# Patient Record
Sex: FEMALE | Race: WHITE
[De-identification: names, ages, dates, MRNs, and addresses within clinical notes are randomized per-mention and may not be internally consistent; named-entity substitution may affect disease eponyms.]

---

## 2020-04-04 ENCOUNTER — HOSPITAL ENCOUNTER (EMERGENCY)
Dept: HOSPITAL 75 - ER FS | Age: 45
Discharge: HOME | End: 2020-04-04
Payer: COMMERCIAL

## 2020-04-04 VITALS — WEIGHT: 201.94 LBS | BODY MASS INDEX: 32.45 KG/M2 | HEIGHT: 66.14 IN

## 2020-04-04 VITALS — DIASTOLIC BLOOD PRESSURE: 93 MMHG | SYSTOLIC BLOOD PRESSURE: 149 MMHG

## 2020-04-04 DIAGNOSIS — Z88.2: ICD-10-CM

## 2020-04-04 DIAGNOSIS — Z85.41: ICD-10-CM

## 2020-04-04 DIAGNOSIS — Z88.0: ICD-10-CM

## 2020-04-04 DIAGNOSIS — R10.31: Primary | ICD-10-CM

## 2020-04-04 LAB
ALBUMIN SERPL-MCNC: 3.8 GM/DL (ref 3.2–4.5)
ALP SERPL-CCNC: 95 U/L (ref 40–136)
ALT SERPL-CCNC: 12 U/L (ref 0–55)
APTT PPP: (no result) S
BACTERIA #/AREA URNS HPF: (no result) /HPF
BASOPHILS # BLD AUTO: 0.1 10^3/UL (ref 0–0.1)
BASOPHILS NFR BLD AUTO: 0 % (ref 0–10)
BILIRUB SERPL-MCNC: 0.3 MG/DL (ref 0.1–1)
BILIRUB UR QL STRIP: NEGATIVE
BUN/CREAT SERPL: 18
CALCIUM SERPL-MCNC: 8.3 MG/DL (ref 8.5–10.1)
CHLORIDE SERPL-SCNC: 106 MMOL/L (ref 98–107)
CO2 SERPL-SCNC: 22 MMOL/L (ref 21–32)
CREAT SERPL-MCNC: 0.67 MG/DL (ref 0.6–1.3)
EOSINOPHIL # BLD AUTO: 0.1 10^3/UL (ref 0–0.3)
EOSINOPHIL NFR BLD AUTO: 1 % (ref 0–10)
ERYTHROCYTE [DISTWIDTH] IN BLOOD BY AUTOMATED COUNT: 13.3 % (ref 10–14.5)
FIBRINOGEN PPP-MCNC: CLEAR MG/DL
GFR SERPLBLD BASED ON 1.73 SQ M-ARVRAT: > 60 ML/MIN
GLUCOSE SERPL-MCNC: 98 MG/DL (ref 70–105)
GLUCOSE UR STRIP-MCNC: NEGATIVE MG/DL
HCT VFR BLD CALC: 40 % (ref 35–52)
HGB BLD-MCNC: 13.5 G/DL (ref 11.5–16)
KETONES UR QL STRIP: NEGATIVE
LEUKOCYTE ESTERASE UR QL STRIP: NEGATIVE
LYMPHOCYTES # BLD AUTO: 1.6 X 10^3 (ref 1–4)
LYMPHOCYTES NFR BLD AUTO: 13 % (ref 12–44)
MANUAL DIFFERENTIAL PERFORMED BLD QL: NO
MCH RBC QN AUTO: 29 PG (ref 25–34)
MCHC RBC AUTO-ENTMCNC: 34 G/DL (ref 32–36)
MCV RBC AUTO: 86 FL (ref 80–99)
MONOCYTES # BLD AUTO: 0.6 X 10^3 (ref 0–1)
MONOCYTES NFR BLD AUTO: 5 % (ref 0–12)
NEUTROPHILS # BLD AUTO: 10 X 10^3 (ref 1.8–7.8)
NEUTROPHILS NFR BLD AUTO: 80 % (ref 42–75)
NITRITE UR QL STRIP: NEGATIVE
PH UR STRIP: 6.5 [PH] (ref 5–9)
PLATELET # BLD: 358 10^3/UL (ref 130–400)
PMV BLD AUTO: 10.2 FL (ref 7.4–10.4)
POTASSIUM SERPL-SCNC: 3.6 MMOL/L (ref 3.6–5)
PROT SERPL-MCNC: 6.9 GM/DL (ref 6.4–8.2)
PROT UR QL STRIP: NEGATIVE
RBC #/AREA URNS HPF: (no result) /HPF
SODIUM SERPL-SCNC: 140 MMOL/L (ref 135–145)
SP GR UR STRIP: <=1.005 (ref 1.02–1.02)
SQUAMOUS #/AREA URNS HPF: (no result) /HPF
WBC # BLD AUTO: 12.4 10^3/UL (ref 4.3–11)
WBC #/AREA URNS HPF: (no result) /HPF

## 2020-04-04 PROCEDURE — 80053 COMPREHEN METABOLIC PANEL: CPT

## 2020-04-04 PROCEDURE — 81000 URINALYSIS NONAUTO W/SCOPE: CPT

## 2020-04-04 PROCEDURE — 85025 COMPLETE CBC W/AUTO DIFF WBC: CPT

## 2020-04-04 PROCEDURE — 87088 URINE BACTERIA CULTURE: CPT

## 2020-04-04 PROCEDURE — 36415 COLL VENOUS BLD VENIPUNCTURE: CPT

## 2020-04-04 PROCEDURE — 74177 CT ABD & PELVIS W/CONTRAST: CPT

## 2020-04-04 NOTE — DIAGNOSTIC IMAGING REPORT
INDICATION: Right lower quadrant pain, nauseated. History of

cervical carcinoma. Prior hysterectomy. Right oophorectomy three

years ago.



EXAMINATION: CT abdomen and pelvis with contrast, 4/4/2020. All

CT scans use one or more of the following dose optimizing

techniques: automated exposure control, MA and/or KvP adjustment

based on patient size and exam type or iterative reconstruction. 





FINDINGS: The appendix is seen and appears to be retrocecal

extending upwards towards the right upper quadrant. No

surrounding inflammation is seen. Findings of mild constipation

seen throughout the colon. No focal inflammation seen about the

colon. Minimal wall thickening of the terminal ileum is

questioned. This could be due to underdistention although focal

inflammation is not excluded, especially given the history of

right lower quadrant pain, and a small amount of fluid is seen

just posterior to the ileum as well. A few scattered minimally

prominent lymph nodes within the right lower quadrant are also

noted. Postoperative findings noted throughout the

retroperitoneum into the pelvis. History of previous right

oophorectomy. There is a small hyperdensity within the left

adnexa, nonspecific, measuring approximately 1 cm in size. This

could represent focal enhancement within the left ovary but is

nonspecific. Pelvic sonography may be useful for further

characterization otherwise follow-up recommended to assure

stability and/or resolution.



The liver is grossly unremarkable. The spleen contains small

hypodensities, nonspecific, possibly due to timing of

enhancement. True small low-density lesion is difficult to

completely exclude but too small for characterization. Pancreas

is normal. The adrenal glands are normal. Kidneys demonstrate no

acute disease. Tiny low density lesions in the left kidney are

noted and too small characterization. Left kidney is somewhat

small compared to the right, nonspecific but perhaps due to

chronic infectious etiology or congenital in nature. There is no

ascites or free air. The osseous structures demonstrate mild

sclerotic changes at the left sacroiliac joint, likely chronic.

There is a small sclerotic area within the left sacral ala which

is very likely on the basis of a bone island; however, given the

known history of previous carcinoma, this should be followed to

exclude a tiny metastatic lesion, less likely at this time. A

similar finding is noted within a right posterior medial mid to

lower rib. Lung bases unremarkable.



IMPRESSION:

1. Mild wall thickening is questioned but the terminal ileum with

adjacent inflammatory change or minimal fluid suggesting

inflammation/infectious etiology correlate with symptoms. The

appendix appears to be retrocecal and extends upwards towards the

liver and is unremarkable.

2. Slightly prominent lymph nodes throughout the right lower

quadrant which are likely reactive; however, given the history of

carcinoma, follow-up is recommended to assure resolution. 

3. Nonspecific tiny rounded nodularity in the left adnexa which

could be associated with the left ovary. Please see above

discussion and recommendations. Other incidental findings as

discussed above.



Dictated by: 



  Dictated on workstation # [id number]

## 2020-04-04 NOTE — ED ABDOMINAL PAIN
General


Chief Complaint:  Abdominal/GI Problems


Stated Complaint:  RT ABD PAIN, NAUSEA


Nursing Triage Note:  


c/o RLQ abdomen pain. denies taking medication for pain. states is nauseated 


mildly but appetite hasn't changed. reports walking makes the pain a little 


worse


Sepsis Screen:  No Definite Risk


Source of Information:  Patient


Exam Limitations:  No Limitations





History of Present Illness


Date Seen by Provider:  Apr 4, 2020


Time Seen by Provider:  14:59


Initial Comments


Onset of generalized abdominal pain, yesterday. Persists today, but now loca

lized to RLQ.  Some nausea and decreased appetite. No vomiting or diarrhea. 

Normal BM yesterday, nothing today. 





Hx of Hysterectomy and R oophorectomy





Allergies and Home Medications


Allergies


Coded Allergies:  


     Penicillins (Verified  Allergy, Unknown, Rash, 4/4/20)


     Sulfa (Sulfonamide Antibiotics) (Verified  Allergy, Unknown, Rash, 4/4/20)





Patient Home Medication List


Home Medication List Reviewed:  Yes





Review of Systems


Review of Systems


Constitutional:  see HPI; No fever, No malaise, No weakness


EENTM:  No Symptoms Reported


Respiratory:  No Symptoms Reported


Cardiovascular:  No Symptoms Reported


Gastrointestinal:  See HPI, Abdominal Pain; Denies Constipated, Denies Diarrhea;

Nausea, Poor Appetite; Denies Rectal Bleeding, Denies Vomiting


Genitourinary:  See HPI; Denies Frequency, Denies Flank Pain, Denies Hematuria, 

Denies Pain, Denies Urgency


Musculoskeletal:  No back pain, No joint pain


Skin:  no symptoms reported





Past Medical-Social-Family Hx


Past Med/Social Hx:  Reviewed Nursing Past Med/Soc Hx


Patient Social History


Alcohol Use:  Denies Use


Recreational Drug Use:  No


Smoking Status:  Never a Smoker


Recent Foreign Travel:  No


Contact w/Someone Who Travel:  No


Recent Infectious Disease Expo:  No





Past Medical History


Surgeries:  Yes


Hysterectomy


Respiratory:  No


Cardiac:  No


Neurological:  No


GYN History:  Hysterectomy


Genitourinary:  No


Gastrointestinal:  No


Musculoskeletal:  No


Endocrine:  No


HEENT:  No


Cancer:  No


Psychosocial:  Yes


Depression


Integumentary:  No





Physical Exam


Vital Signs





Vital Signs - First Documented








 4/4/20





 14:48


 


Temp 36.6


 


Pulse 70


 


Resp 18


 


B/P (MAP) 187/98 (127)


 


Pulse Ox 96


 


O2 Delivery High Flow N/C





Capillary Refill : Less Than 3 Seconds


Height/Weight/BMI


Height: '"


Weight: lbs. oz. kg; 32.00 BMI


Method:


General Appearance:  WD/WN, no apparent distress


Respiratory:  chest non-tender, lungs clear


Cardiovascular:  regular rate, rhythm, no edema


Gastrointestinal:  soft, no organomegaly, no pulsatile mass; No guarding, No 

rebound; tenderness (RLQ); No hernia, No mass, No hepatomegaly, No spleenomegaly


Extremities:  non-tender, normal inspection





Progress/Results/Core Measures


Results/Orders


Lab Results





Laboratory Tests








Test


 4/4/20


14:54 4/4/20


15:12 Range/Units


 


 


Urine Color PALE YELLOW    


 


Urine Clarity CLEAR    


 


Urine pH 6.5   5-9  


 


Urine Specific Gravity <=1.005   1.016-1.022  


 


Urine Protein NEGATIVE   NEGATIVE  


 


Urine Glucose (UA) NEGATIVE   NEGATIVE  


 


Urine Ketones NEGATIVE   NEGATIVE  


 


Urine Nitrite NEGATIVE   NEGATIVE  


 


Urine Bilirubin NEGATIVE   NEGATIVE  


 


Urine Urobilinogen 0.2   < = 1.0  MG/DL


 


Urine Leukocyte Esterase NEGATIVE   NEGATIVE  


 


Urine RBC (Auto) 1+ H  NEGATIVE  


 


Urine RBC RARE    /HPF


 


Urine WBC 0-2    /HPF


 


Urine Squamous Epithelial


Cells 5-10 


 


  /HPF





 


Urine Crystals NONE    /LPF


 


Urine Bacteria FEW H   /HPF


 


Urine Casts NONE    /LPF


 


Urine Mucus NONE    /LPF


 


Urine Culture Indicated YES    


 


White Blood Count


 


 12.4 H


 4.3-11.0


10^3/uL


 


Red Blood Count


 


 4.58 


 4.35-5.85


10^6/uL


 


Hemoglobin  13.5  11.5-16.0  G/DL


 


Hematocrit  40  35-52  %


 


Mean Corpuscular Volume  86  80-99  FL


 


Mean Corpuscular Hemoglobin  29  25-34  PG


 


Mean Corpuscular Hemoglobin


Concent 


 34 


 32-36  G/DL





 


Red Cell Distribution Width  13.3  10.0-14.5  %


 


Platelet Count


 


 358 


 130-400


10^3/uL


 


Mean Platelet Volume  10.2  7.4-10.4  FL


 


Neutrophils (%) (Auto)  80 H 42-75  %


 


Lymphocytes (%) (Auto)  13  12-44  %


 


Monocytes (%) (Auto)  5  0-12  %


 


Eosinophils (%) (Auto)  1  0-10  %


 


Basophils (%) (Auto)  0  0-10  %


 


Neutrophils # (Auto)  10.0 H 1.8-7.8  X 10^3


 


Lymphocytes # (Auto)  1.6  1.0-4.0  X 10^3


 


Monocytes # (Auto)  0.6  0.0-1.0  X 10^3


 


Eosinophils # (Auto)


 


 0.1 


 0.0-0.3


10^3/uL


 


Basophils # (Auto)


 


 0.1 


 0.0-0.1


10^3/uL


 


Sodium Level  140  135-145  MMOL/L


 


Potassium Level  3.6  3.6-5.0  MMOL/L


 


Chloride Level  106    MMOL/L


 


Carbon Dioxide Level  22  21-32  MMOL/L


 


Anion Gap  12  5-14  MMOL/L


 


Blood Urea Nitrogen  12  7-18  MG/DL


 


Creatinine


 


 0.67 


 0.60-1.30


MG/DL


 


Estimat Glomerular Filtration


Rate 


 > 60 


  





 


BUN/Creatinine Ratio  18   


 


Glucose Level  98    MG/DL


 


Calcium Level  8.3 L 8.5-10.1  MG/DL


 


Corrected Calcium  8.5  8.5-10.1  MG/DL


 


Total Bilirubin  0.3  0.1-1.0  MG/DL


 


Aspartate Amino Transf


(AST/SGOT) 


 17 


 5-34  U/L





 


Alanine Aminotransferase


(ALT/SGPT) 


 12 


 0-55  U/L





 


Alkaline Phosphatase  95    U/L


 


Total Protein  6.9  6.4-8.2  GM/DL


 


Albumin  3.8  3.2-4.5  GM/DL








My Orders





Orders - ROVENSTINEALYSSIA DO


Cbc With Automated Diff (4/4/20 14:58)


Comprehensive Metabolic Panel (4/4/20 14:58)


Ed Iv/Invasive Line Start (4/4/20 14:58)


Urinalysis (4/4/20 14:58)


Ct Abdomen/Pelvis W (4/4/20 14:58)


Urine Culture (4/4/20 14:54)


Iohexol Injection (Omnipaque 350 Mg/Ml 1 (4/4/20 15:15)


Received Contrast (Hold Metformin- Contr (4/4/20 15:15)


Sodium Chloride Flush (Catheter Flush Sy (4/4/20 15:15)


Ns (Ivpb) (Sodium Chloride 0.9% Ivpb Bag (4/4/20 15:15)





Medications Given in ED





Current Medications








 Medications  Dose


 Ordered  Sig/Carolyne


 Route  Start Time


 Stop Time Status Last Admin


Dose Admin


 


 Iohexol  100 ml  ONCE  ONCE


 IV  4/4/20 15:15


 4/4/20 15:16 DC 4/4/20 15:30


100 ML


 


 Sodium Chloride  10 ml  AS NEEDED  PRN


 IV  4/4/20 15:15


    4/4/20 15:30


10 ML


 


 Sodium Chloride  100 ml  ONCE  ONCE


 IV  4/4/20 15:15


 4/4/20 15:16 DC 4/4/20 15:30


80 ML








Vital Signs/I&O











 4/4/20





 14:48


 


Temp 36.6


 


Pulse 70


 


Resp 18


 


B/P (MAP) 187/98 (127)


 


Pulse Ox 96


 


O2 Delivery High Flow N/C














Blood Pressure Mean:                    127











Progress


Progress Note :  


Progress Note


Pt stable, well appearing, no distress and w normal VS.  Benign abd exam w 

slight RLQ tenderness.  Relatively normal labs w slight elevation of WBC's.  

Extensive read of CT report w normal appendix.  Pt w Hx of hysterectomy 16 yrs 

ago for cervical CA. 





Advised clear liquid diet for 1 to 3 days and to take a laxative if bowels are 

not moving/ clearing out.  She agrees and understands plan and f/u w PCP next wk

and if not improving or worse and unable to see PCP to come back to the ER





Diagnostic Imaging





Comments





IMPRESSION:


1. Mild wall thickening is questioned but the terminal ileum with


adjacent inflammatory change or minimal fluid suggesting


inflammation/infectious etiology correlate with symptoms. The


appendix appears to be retrocecal and extends upwards towards the


liver and is unremarkable.


2. Slightly prominent lymph nodes throughout the right lower


quadrant which are likely reactive; however, given the history of


carcinoma, follow-up is recommended to assure resolution. 


3. Nonspecific tiny rounded nodularity in the left adnexa which


could be associated with the left ovary. Please see above


discussion and recommendations. Other incidental findings as


discussed above.





  Dictated on workstation # [id number]








Dict:   04/04/20 1538


Trans:   04/04/20 1604


[facility] [id number]





Interpreted by:     RIAZ SNIDER MD





Departure


Impression





   Primary Impression:  


   Abdominal pain


   Qualified Codes:  R10.31 - Right lower quadrant pain


Disposition:  01 HOME, SELF-CARE


Condition:  Stable





Departure-Patient Inst.


Decision time for Depature:  16:18


Referrals:  


[facility] (PCP)


Primary Care Physician








BOBBY ERAZO (Family)


Primary Care Physician


Patient Instructions:  Acute Abdomen (Belly Pain), Adult (DC)





Add. Discharge Instructions:  


Call your PCP on Monday to schedule a follow-up appointment in 1 wk.  If your 

symptoms get worse and you are unable to see your PCP, return to the ER.





All discharge instructions reviewed with patient and/or family. Voiced 

understanding.











ALYSSIA AMEZQUITA DO          Apr 4, 2020 15:02

## 2022-02-06 ENCOUNTER — HOSPITAL ENCOUNTER (EMERGENCY)
Dept: HOSPITAL 75 - ER FS | Age: 47
LOS: 1 days | Discharge: HOME | End: 2022-02-07
Payer: COMMERCIAL

## 2022-02-06 VITALS — SYSTOLIC BLOOD PRESSURE: 231 MMHG | DIASTOLIC BLOOD PRESSURE: 82 MMHG

## 2022-02-06 VITALS — WEIGHT: 199.96 LBS | HEIGHT: 60.63 IN | BODY MASS INDEX: 38.24 KG/M2

## 2022-02-06 DIAGNOSIS — R03.0: Primary | ICD-10-CM

## 2022-02-06 DIAGNOSIS — F41.9: ICD-10-CM

## 2022-02-06 DIAGNOSIS — F32.9: ICD-10-CM

## 2022-02-06 DIAGNOSIS — R20.2: ICD-10-CM

## 2022-02-06 DIAGNOSIS — Z79.899: ICD-10-CM

## 2022-02-06 DIAGNOSIS — G43.909: ICD-10-CM

## 2022-02-06 LAB
ALBUMIN SERPL-MCNC: 4.1 GM/DL (ref 3.2–4.5)
ALP SERPL-CCNC: 124 U/L (ref 40–136)
ALT SERPL-CCNC: 12 U/L (ref 0–55)
APTT BLD: 27 SEC (ref 24–35)
APTT PPP: YELLOW S
BACTERIA #/AREA URNS HPF: (no result) /HPF
BASOPHILS # BLD AUTO: 0 10^3/UL (ref 0–0.1)
BASOPHILS NFR BLD AUTO: 0 % (ref 0–10)
BILIRUB SERPL-MCNC: 0.3 MG/DL (ref 0.1–1)
BILIRUB UR QL STRIP: NEGATIVE
BUN/CREAT SERPL: 13
CALCIUM SERPL-MCNC: 8.9 MG/DL (ref 8.5–10.1)
CHLORIDE SERPL-SCNC: 97 MMOL/L (ref 98–107)
CO2 SERPL-SCNC: 23 MMOL/L (ref 21–32)
CREAT SERPL-MCNC: 0.85 MG/DL (ref 0.6–1.3)
EOSINOPHIL # BLD AUTO: 0.2 10^3/UL (ref 0–0.3)
EOSINOPHIL NFR BLD AUTO: 2 % (ref 0–10)
FIBRINOGEN PPP-MCNC: CLEAR MG/DL
GFR SERPLBLD BASED ON 1.73 SQ M-ARVRAT: 85 ML/MIN
GLUCOSE SERPL-MCNC: 129 MG/DL (ref 70–105)
GLUCOSE UR STRIP-MCNC: NEGATIVE MG/DL
HCT VFR BLD CALC: 37 % (ref 35–52)
HGB BLD-MCNC: 12.5 G/DL (ref 11.5–16)
INR PPP: 1 (ref 0.8–1.4)
KETONES UR QL STRIP: NEGATIVE
LEUKOCYTE ESTERASE UR QL STRIP: NEGATIVE
LIPASE SERPL-CCNC: 51 U/L (ref 8–78)
LYMPHOCYTES # BLD AUTO: 2.5 X 10^3 (ref 1–4)
LYMPHOCYTES NFR BLD AUTO: 24 % (ref 12–44)
MAGNESIUM SERPL-MCNC: 2 MG/DL (ref 1.6–2.4)
MANUAL DIFFERENTIAL PERFORMED BLD QL: NO
MCH RBC QN AUTO: 28 PG (ref 25–34)
MCHC RBC AUTO-ENTMCNC: 34 G/DL (ref 32–36)
MCV RBC AUTO: 84 FL (ref 80–99)
MONOCYTES # BLD AUTO: 0.7 X 10^3 (ref 0–1)
MONOCYTES NFR BLD AUTO: 7 % (ref 0–12)
NEUTROPHILS # BLD AUTO: 6.9 X 10^3 (ref 1.8–7.8)
NEUTROPHILS NFR BLD AUTO: 67 % (ref 42–75)
NITRITE UR QL STRIP: NEGATIVE
PH UR STRIP: 6 [PH] (ref 5–9)
PLATELET # BLD: 299 10^3/UL (ref 130–400)
PMV BLD AUTO: 9.9 FL (ref 9–12.2)
POTASSIUM SERPL-SCNC: 4 MMOL/L (ref 3.6–5)
PROT SERPL-MCNC: 7.2 GM/DL (ref 6.4–8.2)
PROT UR QL STRIP: NEGATIVE
PROTHROMBIN TIME: 13.7 SEC (ref 12.2–14.7)
RBC #/AREA URNS HPF: (no result) /HPF
SODIUM SERPL-SCNC: 133 MMOL/L (ref 135–145)
SP GR UR STRIP: <=1.005 (ref 1.02–1.02)
SQUAMOUS #/AREA URNS HPF: (no result) /HPF
WBC # BLD AUTO: 10.3 10^3/UL (ref 4.3–11)
WBC #/AREA URNS HPF: (no result) /HPF

## 2022-02-06 PROCEDURE — 84484 ASSAY OF TROPONIN QUANT: CPT

## 2022-02-06 PROCEDURE — 83735 ASSAY OF MAGNESIUM: CPT

## 2022-02-06 PROCEDURE — 84703 CHORIONIC GONADOTROPIN ASSAY: CPT

## 2022-02-06 PROCEDURE — 85025 COMPLETE CBC W/AUTO DIFF WBC: CPT

## 2022-02-06 PROCEDURE — 85610 PROTHROMBIN TIME: CPT

## 2022-02-06 PROCEDURE — 81000 URINALYSIS NONAUTO W/SCOPE: CPT

## 2022-02-06 PROCEDURE — 85730 THROMBOPLASTIN TIME PARTIAL: CPT

## 2022-02-06 PROCEDURE — 83690 ASSAY OF LIPASE: CPT

## 2022-02-06 PROCEDURE — 36415 COLL VENOUS BLD VENIPUNCTURE: CPT

## 2022-02-06 PROCEDURE — 80053 COMPREHEN METABOLIC PANEL: CPT

## 2022-02-06 PROCEDURE — 93041 RHYTHM ECG TRACING: CPT

## 2022-02-06 PROCEDURE — 83880 ASSAY OF NATRIURETIC PEPTIDE: CPT

## 2022-02-06 PROCEDURE — 93005 ELECTROCARDIOGRAM TRACING: CPT

## 2022-02-06 PROCEDURE — 71045 X-RAY EXAM CHEST 1 VIEW: CPT

## 2022-02-06 PROCEDURE — 83874 ASSAY OF MYOGLOBIN: CPT

## 2022-02-06 NOTE — DIAGNOSTIC IMAGING REPORT
INDICATION: Hypertension, left arm pain.



EXAMINATION: Chest, 02/06/2022.



FINDINGS: The cardiomediastinal silhouette is unremarkable. The

pulmonary vasculature is within normal limits.



The lungs and pleural spaces are clear.



IMPRESSION: No evidence of an acute cardiopulmonary process. 



Dictated by: 



  Dictated on workstation # [id number]

## 2022-02-06 NOTE — ED GENERAL
General


Stated Complaint:  LT ARM PAIN;HIGH BP


Source of Information:  Patient, Spouse





History of Present Illness


Date Seen by Provider:  2022


Time Seen by Provider:  22:01


Initial Comments


45 yo female presenting with complaint of left arm pain that has been present 

all day. She has been checking blood pressure at home and it has been running 

high. She normally does not have elevated blood pressure. Her left arm has been 

aching all day and at times she has had some numbness going down to the fingers.

This evening she started having a migraine headache with pressure behind her 

eyes left greater than right. This felt like her typical migraine but was a 

little more intense than usual. She took her Sumatriptan to try and help with 

the Migraine. She also takes 2 medicines for anxiety/depression and birth 

control. She denies having problems like this before and is not aware of any 

family  history of high blood pressure or heart disease. She had been checking 

her blood pressure throughout the day and it was running  high all day as well 

as the arm aching and intermittently numb, but she did not seek medical 

attention until 10 pm, after having symptoms all day. She states nothing was 

different now compared to earlier in the day to  make her come to the ED now vs 

earlier. She denies fever, chills, cough, shortness of breath, blurred vision, 

vision changes, nausea, vomiting, diarrhea, pain with urination, abdominal pain,

numbness or pain in other extremities.


Timing/Duration:  12 Hours


Severity:  Moderate


Associated Systoms:  No Chest Pain, No Cough, No Diaphoresis, No Fever/Chills; 

Headaches (frontal headache worse behind left eye similar to migraine headaches 

that she chronically gets); No Loss of Appetite, No Malaise, No Nausea/Vomiting,

No Rash, No Seizure, No Shortness of Air, No Syncope, No Weakness





Allergies and Home Medications


Allergies


Coded Allergies:  


     Penicillins (Verified  Allergy, Unknown, Rash, 20)


     Sulfa (Sulfonamide Antibiotics) (Verified  Allergy, Unknown, Rash, 20)





Patient Home Medication List


Home Medication List Reviewed:  Yes


Fluoxetine HCl (Fluoxetine HCl) 20 Mg Capsule, (Reported)


   Entered as Reported by: ADELA REEDER on 20 2538


Lisinopril (Lisinopril) 10 Mg Tablet, 10 MG PO DAILY


   Prescribed by: CHUY TURNER on 22 2316


Norgestimate-Ethinyl Estradiol (Sprintec 28 Day Tablet) 1 Each Tablet, 

(Reported)


   Entered as Reported by: ADELA REEDER on 20 145


Sumatriptan Succinate (Sumatriptan Succinate) 50 Mg Tablet, (Reported)


   Entered as Reported by: ADELA REEDER on 20 1451





Review of Systems


Review of Systems


Constitutional:  No chills, No diaphoresis, No dizziness, No fever, No weakness


EENTM:  No ear discharge, No hearing loss, No ear pain, No blurred vision, No 

double vision, No vision loss, No hoarseness, No epistaxis, No nose congestion


Respiratory:  No cough, No dyspnea on exertion, No short of breath


Cardiovascular:  No chest pain, No edema, No palpitations


Gastrointestinal:  No abdominal pain, No nausea, No vomiting


Genitourinary:  No discharge, No dysuria, No frequency


Musculoskeletal:  see HPI; No joint swelling, No neck pain


Skin:  No change in color, No rash


Psychiatric/Neurological:  See HPI, Anxiety





Past Medical-Social-Family Hx


Past Medical History


Surgery/Hospitalization HX:  


Migraine Headaches, Anxiety, Depression


Surgeries:  Yes


Hysterectomy


Respiratory:  No


Cardiac:  No


Neurological:  No


GYN History:  Hysterectomy


Genitourinary:  No


Gastrointestinal:  No


Musculoskeletal:  No


Endocrine:  No


HEENT:  No


Cancer:  No


Psychosocial:  Yes


Depression


Integumentary:  No





Physical Exam


Vital Signs





Vital Signs - First Documented








 22





 22:04 22:45


 


Temp 36.3 


 


Pulse 92 


 


Resp 20 


 


B/P (MAP) 231/82 (131) 


 


Pulse Ox 99 


 


O2 Delivery  Room Air





Capillary Refill :


Height, Weight, BMI


Height: '"


Weight: lbs. oz. kg; 32.00 BMI


Method:


General Appearance:  WD/WN, Anxious


HEENT:  PERRL/EOMI, Normal ENT Inspection, Pharynx Normal


Neck:  Full Range of Motion, Normal Inspection, Non Tender, Supple


Respiratory:  Chest Non Tender, Lungs Clear, Normal Breath Sounds, No Accessory 

Muscle Use, No Respiratory Distress


Cardiovascular:  Regular Rate, Rhythm, No Murmur, Normal Peripheral Pulses


Gastrointestinal:  Normal Bowel Sounds, No Pulsatile Mass, Non Tender, Soft


Rectal:  Deferred


Extremity:  Normal Capillary Refill, Normal Inspection, No Pedal Edema


Neurologic/Psychiatric:  Alert, Oriented x3, CNs II-XII Norm as Tested


Skin:  Normal Color, Warm/Dry; No Rash





Progress/Results/Core Measures


Suspected Sepsis


SIRS


Temperature: 


Pulse:  


Respiratory Rate: 


 


Laboratory Tests


22 22:20: White Blood Count 10.3


Blood Pressure  / 


Mean: 


 





Laboratory Tests


22 22:20: 


Creatinine 0.85, INR Comment 1.0, Platelet Count 299, Total Bilirubin 0.3








Results/Orders


Lab Results





Laboratory Tests








Test


 22


22:20 22


23:15 Range/Units


 


 


White Blood Count


 10.3 


 


 4.3-11.0


10^3/uL


 


Red Blood Count


 4.44 


 


 3.80-5.11


10^6/uL


 


Hemoglobin 12.5   11.5-16.0  g/dL


 


Hematocrit 37   35-52  %


 


Mean Corpuscular Volume 84   80-99  fL


 


Mean Corpuscular Hemoglobin 28   25-34  pg


 


Mean Corpuscular Hemoglobin


Concent 34 


 


 32-36  g/dL





 


Red Cell Distribution Width 13.3   10.0-14.5  %


 


Platelet Count


 299 


 


 130-400


10^3/uL


 


Mean Platelet Volume 9.9   9.0-12.2  fL


 


Immature Granulocyte % (Auto) 1    %


 


Neutrophils (%) (Auto) 67   42-75  %


 


Lymphocytes (%) (Auto) 24   12-44  %


 


Monocytes (%) (Auto) 7   0-12  %


 


Eosinophils (%) (Auto) 2   0-10  %


 


Basophils (%) (Auto) 0   0-10  %


 


Neutrophils # (Auto) 6.9   1.8-7.8  X 10^3


 


Lymphocytes # (Auto) 2.5   1.0-4.0  X 10^3


 


Monocytes # (Auto) 0.7   0.0-1.0  X 10^3


 


Eosinophils # (Auto)


 0.2 


 


 0.0-0.3


10^3/uL


 


Basophils # (Auto)


 0.0 


 


 0.0-0.1


10^3/uL


 


Immature Granulocyte # (Auto)


 0.1 


 


 0.0-0.1


10^3/uL


 


Prothrombin Time 13.7   12.2-14.7  SEC


 


INR Comment 1.0   0.8-1.4  


 


Activated Partial


Thromboplast Time 27 


 


 24-35  SEC





 


Sodium Level 133 L  135-145  MMOL/L


 


Potassium Level 4.0   3.6-5.0  MMOL/L


 


Chloride Level 97 L    MMOL/L


 


Carbon Dioxide Level 23   21-32  MMOL/L


 


Anion Gap 13   5-14  MMOL/L


 


Blood Urea Nitrogen 11   7-18  MG/DL


 


Creatinine


 0.85 


 


 0.60-1.30


MG/DL


 


Estimat Glomerular Filtration


Rate 85 


 


  





 


BUN/Creatinine Ratio 13    


 


Glucose Level 129 H    MG/DL


 


Calcium Level 8.9   8.5-10.1  MG/DL


 


Corrected Calcium 8.8   8.5-10.1  MG/DL


 


Magnesium Level 2.0   1.6-2.4  MG/DL


 


Total Bilirubin 0.3   0.1-1.0  MG/DL


 


Aspartate Amino Transf


(AST/SGOT) 14 


 


 5-34  U/L





 


Alanine Aminotransferase


(ALT/SGPT) 12 


 


 0-55  U/L





 


Alkaline Phosphatase 124     U/L


 


Myoglobin


 < 21.0 


 


 10.0-92.0


NG/ML


 


Troponin I < 0.30   <0.30  NG/ML


 


Pro-B-Type Natriuretic Peptide 485.4 H  <75.0  PG/ML


 


Total Protein 7.2   6.4-8.2  GM/DL


 


Albumin 4.1   3.2-4.5  GM/DL


 


Lipase 51   8-78  U/L


 


Urine Color  YELLOW   


 


Urine Clarity  CLEAR   


 


Urine pH  6.0  5-9  


 


Urine Specific Gravity  <=1.005  1.016-1.022  


 


Urine Protein  NEGATIVE  NEGATIVE  


 


Urine Glucose (UA)  NEGATIVE  NEGATIVE  


 


Urine Ketones  NEGATIVE  NEGATIVE  


 


Urine Nitrite  NEGATIVE  NEGATIVE  


 


Urine Bilirubin  NEGATIVE  NEGATIVE  


 


Urine Urobilinogen  0.2  < = 1.0  MG/DL


 


Urine Leukocyte Esterase  NEGATIVE  NEGATIVE  


 


Urine RBC (Auto)  NEGATIVE  NEGATIVE  


 


Urine RBC  RARE   /HPF


 


Urine WBC  NONE   /HPF


 


Urine Squamous Epithelial


Cells 


 RARE 


  /HPF





 


Urine Crystals  NONE   /LPF


 


Urine Bacteria  FEW H  /HPF


 


Urine Casts  NONE   /LPF


 


Urine Mucus  NEGATIVE   /LPF


 


Urine Other     /HPF


 


Urine Culture Indicated  NO   








My Orders





Orders - CHUY TURNER MD


Cbc With Automated Diff (22 22:02)


Magnesium (22 22:02)


Chest 1 View Ap/Pa Only (22 22:02)


Ekg Tracing (22 22:02)


Comprehensive Metabolic Panel (22 22:02)


Myoglobin Serum (22 22:02)


Protime With Inr (22 22:02)


Partial Thromboplastin Time (22 22:02)


O2 (22 22:02)


Monitor-Rhythm Ecg Trace Only (22 22:02)


Ed Iv/Invasive Line Start (22 22:02)


Lipase (22 22:02)


Troponin I Fs (22 22:02)


Probnp Fs (22 22:02)


Ua Culture If Indicated (22 22:02)


Urine Pregnancy Bedside (22 22:02)


Hydralazine Injection (Apresoline Inject (22 22:16)


Lisinopril  Tablet (Zestril Tablet) (22 23:13)





Vital Signs/I&O











 22





 22:04 22:30 22:45 23:00


 


Temp 36.3   


 


Pulse 92 72 70 69


 


Resp 20  16 17


 


B/P (MAP) 231/82 (131) 200/109 169/96 161/77


 


Pulse Ox 99  98 98


 


O2 Delivery   Room Air Room Air


 


    





 22   





 23:30   


 


Pulse 70   


 


Resp 21   


 


B/P (MAP) 163/103   


 


Pulse Ox 98   





Capillary Refill :


Progress Note #1:  


Progress Note


Check basic labs, cardiac enzymes, urine, urine drug screen, CXR, ECG. Give 

Hydralazine 10 mg IV to try and help with blood pressure. 





Differential diagnosis includes anxiety, new diagnosis hypertension, cervical 

radiculopathy, lung mass, coronary artery disease, acute coronary syndrome, 

hypertensive urgency


Progress Note #2:  


   Time:  22:29


Progress Note


Electrocardiogram does not show any acute ST elevation.  Her chest x-ray did not

show any acute process, cardiomegaly, pleural effusion.  The CBC was stable 

without acute significant abnormalities.


Progress Note #3:  


Progress Note


Blood pressure is improved but not completely back to normal.  She is ranging 

160-170 systolic.  Her symptoms in her left arm have resolved.  She reports that

she is feeling better overall.  Counseled on management of hypertension and that

with her blood pressure of over 200 we would not want to drop her blood pressure

lower than 160-170 because it could cause brain swelling and complications.


Counseled that her urine does not show any signs of proteinuria or glucose in 

the urine.  She was given a dose of lisinopril to start helping with her blood 

pressure and a prescription was sent to the pharmacy.  Advised to follow-up 

through the clinic as well as given information about ways she could help 

control her blood pressure through diet and exercise.


Reviewed follow-up and return precautions.





ECG


Initial ECG Impression Date:  2022


Initial ECG Impression Time:  22:00


Initial ECG Rate:  82


Initial ECG Rhythm:  Normal Sinus


Initial ECG Comparisson:  No Previous ECG Available


Comment


Normal sinus rhythm with a heart rate of 82 bpm.  NC interval 140 ms.  No acute 

ST elevation.  QT interval 378 ms with a QTc interval 442 ms.  There is no prior

tracing available for comparison.





Diagnostic Imaging





   Diagonstic Imaging:  Xray


   Plain Films/CT/US/NM/MRI:  chest


Comments


NAME:   JEFFREY SHABAZZ


[facility] REC#:   [id number]


ACCOUNT#:   [id number]


PT STATUS:   REG ER


:   1975


PHYSICIAN:   CHUY TURNER MD


ADMIT DATE:   22/ER FS


                                  ***Signed***


Date of Exam:22





CHEST 1 VIEW AP/PA ONLY








INDICATION: Hypertension, left arm pain.





EXAMINATION: Chest, 2022.





FINDINGS: The cardiomediastinal silhouette is unremarkable. The


pulmonary vasculature is within normal limits.





The lungs and pleural spaces are clear.





IMPRESSION: No evidence of an acute cardiopulmonary process. 





Dictated by: 





  Dictated on workstation # [id number]








Dict:   22


Trans:   22


[id number]





Interpreted by:     RIAZ SNIDER MD


Electronically signed by: RIAZ SNIDER MD 22


   Reviewed:  Reviewed by Me





Departure


Impression





   Primary Impression:  


   Elevated blood pressure reading without diagnosis of hypertension


   Additional Impression:  


   Paresthesia of left upper extremity


Disposition:  01 HOME, SELF-CARE


Condition:  Stable





Departure-Patient Inst.


Decision time for Depature:  23:47


Referrals:  


[facility] (PCP)


Primary Care Physician








[facility] (Family)


Primary Care Physician


Patient Instructions:  High Blood Pressure ED, Lowering Your Risk of High Blood 

Pressure, Checking Your Blood Pressure at Home, DASH Diet, Controlling Your 

Blood Pressure Through Lifestyle





Add. Discharge Instructions:  


Stay well hydrated and drink plenty of water.


Avoid caffeinated drinks as this could elevate your blood pressure.





Take the low dose blood pressure medicine to help control your blood pressure 

and call the clinic in the morning to get appointment for follow up and 

continued management of your blood pressure.


Scripts


Lisinopril (Lisinopril) 10 Mg Tablet


10 MG PO DAILY for Blood Pressure for 30 Days, #30 TAB 0 Refills


   Prov: CHUY TURNER MD         22











CHUY TURNER MD                2022 22:23